# Patient Record
(demographics unavailable — no encounter records)

---

## 2021-12-02 NOTE — NUR
PATIENT BIBSELF C/O HEART RACING. PATIENT HAS HX OF ANXIETY. PATIENT IS A/O X 
4, RR EVEN AND UNLABORED, NO SOB NOTED. PATIENT CONNECTED TO CARDAIC AND POX 
MONITOR.